# Patient Record
Sex: MALE | Race: WHITE | Employment: STUDENT | URBAN - METROPOLITAN AREA
[De-identification: names, ages, dates, MRNs, and addresses within clinical notes are randomized per-mention and may not be internally consistent; named-entity substitution may affect disease eponyms.]

---
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
## 2022-02-08 RX ORDER — MULTIVIT WITH MINERALS/LUTEIN
1000 TABLET ORAL DAILY
COMMUNITY

## 2022-02-08 RX ORDER — GABAPENTIN 300 MG/1
400 CAPSULE ORAL NIGHTLY
COMMUNITY

## 2022-02-08 RX ORDER — MULTIVIT,TX WITH IRON,MINERALS
1 TABLET, EXTENDED RELEASE ORAL DAILY
COMMUNITY

## 2022-02-08 RX ORDER — MELATONIN
1000 DAILY
COMMUNITY

## 2022-02-08 RX ORDER — CYCLOBENZAPRINE HCL 10 MG
10 TABLET ORAL 3 TIMES DAILY PRN
COMMUNITY
End: 2022-03-14

## 2022-03-11 ENCOUNTER — LAB ENCOUNTER (OUTPATIENT)
Dept: LAB | Facility: HOSPITAL | Age: 22
End: 2022-03-11
Attending: ORTHOPAEDIC SURGERY
Payer: COMMERCIAL

## 2022-03-11 DIAGNOSIS — Z01.818 PRE-OP TESTING: ICD-10-CM

## 2022-03-11 DIAGNOSIS — M51.26 HNP (HERNIATED NUCLEUS PULPOSUS), LUMBAR: ICD-10-CM

## 2022-03-11 LAB
BILIRUB UR QL STRIP.AUTO: NEGATIVE
COLOR UR AUTO: YELLOW
GLUCOSE UR STRIP.AUTO-MCNC: NEGATIVE MG/DL
KETONES UR STRIP.AUTO-MCNC: NEGATIVE MG/DL
LEUKOCYTE ESTERASE UR QL STRIP.AUTO: NEGATIVE
NITRITE UR QL STRIP.AUTO: NEGATIVE
PH UR STRIP.AUTO: 7.5 [PH] (ref 5–8)
PROT UR STRIP.AUTO-MCNC: NEGATIVE MG/DL
RBC UR QL AUTO: NEGATIVE
SP GR UR STRIP.AUTO: 1.02 (ref 1–1.03)
UROBILINOGEN UR STRIP.AUTO-MCNC: 0.2 MG/DL

## 2022-03-11 PROCEDURE — 81003 URINALYSIS AUTO W/O SCOPE: CPT

## 2022-03-12 LAB — SARS-COV-2 RNA RESP QL NAA+PROBE: NOT DETECTED

## 2022-03-14 ENCOUNTER — HOSPITAL ENCOUNTER (OUTPATIENT)
Facility: HOSPITAL | Age: 22
Setting detail: HOSPITAL OUTPATIENT SURGERY
Discharge: HOME | End: 2022-03-14
Attending: ORTHOPAEDIC SURGERY | Admitting: ORTHOPAEDIC SURGERY
Payer: COMMERCIAL

## 2022-03-14 ENCOUNTER — ANESTHESIA (OUTPATIENT)
Dept: SURGERY | Facility: HOSPITAL | Age: 22
End: 2022-03-14
Payer: COMMERCIAL

## 2022-03-14 ENCOUNTER — APPOINTMENT (OUTPATIENT)
Dept: GENERAL RADIOLOGY | Facility: HOSPITAL | Age: 22
End: 2022-03-14
Attending: ORTHOPAEDIC SURGERY
Payer: COMMERCIAL

## 2022-03-14 ENCOUNTER — ANESTHESIA EVENT (OUTPATIENT)
Dept: SURGERY | Facility: HOSPITAL | Age: 22
End: 2022-03-14
Payer: COMMERCIAL

## 2022-03-14 VITALS
HEIGHT: 76 IN | SYSTOLIC BLOOD PRESSURE: 124 MMHG | OXYGEN SATURATION: 96 % | WEIGHT: 286.63 LBS | HEART RATE: 75 BPM | RESPIRATION RATE: 16 BRPM | TEMPERATURE: 98 F | BODY MASS INDEX: 34.9 KG/M2 | DIASTOLIC BLOOD PRESSURE: 83 MMHG

## 2022-03-14 DIAGNOSIS — M51.26 HNP (HERNIATED NUCLEUS PULPOSUS), LUMBAR: ICD-10-CM

## 2022-03-14 DIAGNOSIS — Z01.818 PRE-OP TESTING: Primary | ICD-10-CM

## 2022-03-14 PROCEDURE — 0SB20ZZ EXCISION OF LUMBAR VERTEBRAL DISC, OPEN APPROACH: ICD-10-PCS | Performed by: ORTHOPAEDIC SURGERY

## 2022-03-14 PROCEDURE — 72020 X-RAY EXAM OF SPINE 1 VIEW: CPT | Performed by: ORTHOPAEDIC SURGERY

## 2022-03-14 PROCEDURE — 88304 TISSUE EXAM BY PATHOLOGIST: CPT | Performed by: ORTHOPAEDIC SURGERY

## 2022-03-14 PROCEDURE — 88311 DECALCIFY TISSUE: CPT | Performed by: ORTHOPAEDIC SURGERY

## 2022-03-14 DEVICE — COLLAGEN DURAL REGENERATION MEMBRANE 1IN X 3IN (2.5CM X 7.5CM)
Type: IMPLANTABLE DEVICE | Site: BACK | Status: FUNCTIONAL
Brand: DURAMATRIX-ONLAY PLUS

## 2022-03-14 RX ORDER — VANCOMYCIN 2 GRAM/500 ML IN 0.9 % SODIUM CHLORIDE INTRAVENOUS
15 ONCE
Status: COMPLETED | OUTPATIENT
Start: 2022-03-14 | End: 2022-03-14

## 2022-03-14 RX ORDER — MIDAZOLAM HYDROCHLORIDE 1 MG/ML
INJECTION INTRAMUSCULAR; INTRAVENOUS AS NEEDED
Status: DISCONTINUED | OUTPATIENT
Start: 2022-03-14 | End: 2022-03-14 | Stop reason: SURG

## 2022-03-14 RX ORDER — ROCURONIUM BROMIDE 10 MG/ML
INJECTION, SOLUTION INTRAVENOUS AS NEEDED
Status: DISCONTINUED | OUTPATIENT
Start: 2022-03-14 | End: 2022-03-14 | Stop reason: SURG

## 2022-03-14 RX ORDER — ONDANSETRON 2 MG/ML
4 INJECTION INTRAMUSCULAR; INTRAVENOUS ONCE
Status: COMPLETED | OUTPATIENT
Start: 2022-03-14 | End: 2022-03-14

## 2022-03-14 RX ORDER — HYDROMORPHONE HYDROCHLORIDE 1 MG/ML
0.4 INJECTION, SOLUTION INTRAMUSCULAR; INTRAVENOUS; SUBCUTANEOUS EVERY 5 MIN PRN
Status: DISCONTINUED | OUTPATIENT
Start: 2022-03-14 | End: 2022-03-14

## 2022-03-14 RX ORDER — CELECOXIB 200 MG/1
200 CAPSULE ORAL ONCE
Status: COMPLETED | OUTPATIENT
Start: 2022-03-14 | End: 2022-03-14

## 2022-03-14 RX ORDER — NALOXONE HYDROCHLORIDE 0.4 MG/ML
80 INJECTION, SOLUTION INTRAMUSCULAR; INTRAVENOUS; SUBCUTANEOUS AS NEEDED
Status: DISCONTINUED | OUTPATIENT
Start: 2022-03-14 | End: 2022-03-14

## 2022-03-14 RX ORDER — ACETAMINOPHEN 500 MG
1000 TABLET ORAL ONCE
Status: DISCONTINUED | OUTPATIENT
Start: 2022-03-14 | End: 2022-03-14 | Stop reason: HOSPADM

## 2022-03-14 RX ORDER — SODIUM CHLORIDE, SODIUM LACTATE, POTASSIUM CHLORIDE, CALCIUM CHLORIDE 600; 310; 30; 20 MG/100ML; MG/100ML; MG/100ML; MG/100ML
INJECTION, SOLUTION INTRAVENOUS CONTINUOUS
Status: DISCONTINUED | OUTPATIENT
Start: 2022-03-14 | End: 2022-03-14

## 2022-03-14 RX ORDER — ONDANSETRON 2 MG/ML
INJECTION INTRAMUSCULAR; INTRAVENOUS AS NEEDED
Status: DISCONTINUED | OUTPATIENT
Start: 2022-03-14 | End: 2022-03-14 | Stop reason: SURG

## 2022-03-14 RX ORDER — LIDOCAINE HYDROCHLORIDE 10 MG/ML
INJECTION, SOLUTION EPIDURAL; INFILTRATION; INTRACAUDAL; PERINEURAL AS NEEDED
Status: DISCONTINUED | OUTPATIENT
Start: 2022-03-14 | End: 2022-03-14 | Stop reason: SURG

## 2022-03-14 RX ORDER — HYDROCODONE BITARTRATE AND ACETAMINOPHEN 10; 325 MG/1; MG/1
1 TABLET ORAL EVERY 6 HOURS PRN
Qty: 40 TABLET | Refills: 0 | Status: SHIPPED | OUTPATIENT
Start: 2022-03-14

## 2022-03-14 RX ADMIN — LIDOCAINE HYDROCHLORIDE 50 MG: 10 INJECTION, SOLUTION EPIDURAL; INFILTRATION; INTRACAUDAL; PERINEURAL at 11:00:00

## 2022-03-14 RX ADMIN — ROCURONIUM BROMIDE 50 MG: 10 INJECTION, SOLUTION INTRAVENOUS at 11:01:00

## 2022-03-14 RX ADMIN — VANCOMYCIN 2 GRAM/500 ML IN 0.9 % SODIUM CHLORIDE INTRAVENOUS 2 G: at 11:10:00

## 2022-03-14 RX ADMIN — MIDAZOLAM HYDROCHLORIDE 2 MG: 1 INJECTION INTRAMUSCULAR; INTRAVENOUS at 10:55:00

## 2022-03-14 RX ADMIN — ROCURONIUM BROMIDE 20 MG: 10 INJECTION, SOLUTION INTRAVENOUS at 11:25:00

## 2022-03-14 RX ADMIN — ONDANSETRON 4 MG: 2 INJECTION INTRAMUSCULAR; INTRAVENOUS at 12:46:00

## 2022-03-14 NOTE — ANESTHESIA PROCEDURE NOTES
Airway  Date/Time: 3/14/2022 11:02 AM  Urgency: elective      General Information and Staff    Patient location during procedure: OR  Anesthesiologist: Jolly Dhaliwal MD  Performed: anesthesiologist     Indications and Patient Condition  Indications for airway management: airway protection and anesthesia  Spontaneous Ventilation: absent  Preoxygenated: yes  Patient position: sniffing  Mask difficulty assessment: 1 - vent by mask    Final Airway Details  Final airway type: endotracheal airway      Successful airway: ETT    Successful intubation technique: Video laryngoscopy  Facilitating devices/methods: intubating stylet  Endotracheal tube insertion site: oral  Blade: Christi  Blade size: #3  ETT size (mm): 8.0    Cormack-Lehane Classification: grade I - full view of glottis  Placement verified by: chest auscultation and capnometry   Measured from: teeth  ETT to teeth (cm): 23  Number of attempts at approach: 1  Ventilation between attempts: none  Number of other approaches attempted: 0

## 2022-03-14 NOTE — BRIEF OP NOTE
Pre-Operative Diagnosis: HNP (herniated nucleus pulposus), lumbar [M51.26]     Post-Operative Diagnosis: HNP (herniated nucleus pulposus), lumbar [M51.26]      Procedure Performed:   Lumbar 3-Lumbar 4, Lumbar 4-Lumbar 5 microdecompression      Surgeon(s) and Role:     Rosana Rock MD - Primary    Assistant(s):  PA: Singh Schulz     Surgical Findings: above     Specimen: 1: L4-5 disc herniation  2: L3-4 disc herniation     Estimated Blood Loss: Blood Output: 100 mL (3/14/2022 12:44 PM)      Dictation Number:      COLTON Campos  3/14/2022  1:08 PM

## 2022-03-24 NOTE — OPERATIVE REPORT
[facility]    PATIENT'S NAME: Christiano Kenny   ATTENDING PHYSICIAN: Pawan Marsh M.D. OPERATING PHYSICIAN: Pawan Marsh M.D. PATIENT ACCOUNT#:   [de-identified]    LOCATION:  [facility] 2 EDWP 10  MEDICAL RECORD #:   [id number]       YOB: 2000  ADMISSION DATE:       03/14/2022      OPERATION DATE:  03/14/2022    OPERATIVE REPORT      PREOPERATIVE DIAGNOSIS:  L3-4, L4-5 severe herniated nucleus pulposus, stenosis, central and bilateral.  POSTOPERATIVE DIAGNOSIS:  L3-4, L4-5 severe herniated nucleus pulposus, stenosis, central and bilateral.  PROCEDURE PERFORMED:  L3-4, L4-5 bilateral decompression discectomy. INDICATIONS:  The patient had failed reasonable conservative measures which are outlined in the patient's clinic medical record. Physical therapy, medical management, injections, alternative treatment are among those offered unless motor deficits are progressive. Indications for surgery are based on scientific literature and WESLEY guidelines. A thorough meeting with the patient and at least one key caregiver was had. The risks and benefits were discussed in significant detail. The risks include, but are not limited to, bleeding, infection, spinal leaks, nerve injuries, recurrent disc herniation, lumbar instability, and need for further surgery. I have gone over the operation using models, diagrams, and the patient's own imaging studies. Additional written and digital sources were provided. No guarantees were made. ASSISTANT:  Kieth Skiff. DESTINY Venegas. A skilled and experienced assistant was required for the entire surgical procedure. As a lumbar spinal reconstruction, the procedure is done in immediate proximity to critical neural elements and is done in close proximity to the critical vascular and visceral structures. Much of the surgery is done in and around the cauda equine and its exiting nerves.   Any assistance, including, but not limited to, retraction, suction, and other means of facilitation of the procedure requires an individual with substantial postgraduate training and substantial spinal surgical experience. ESTIMATED BLOOD LOSS:  100 mL. SPECIMENS:  L4-5 disc herniation, L3-4 disc herniation. DESCRIPTION OF PROCEDURE:  After obtaining informed consent, the patient was taken to the operating room. SCDs and BONIFACIO hose were applied. Preoperative antibiotics were delivered. The patient was placed in the prone position on a [employer], Vladimir frame. All bony prominences were padded. The back was sterilely prepped and draped after an incision had been marked and confirmed using x-ray with a sterilely applied needle. The back was sterilely prepped and draped, and a longitudinal incision was made with a #10 blade at the L3-4 and L4-5 levels. Bovie electrocautery was used for hemostasis. Dissection was taken down to the level of the fascia. Subperiosteal dissection was taken at all levels outlined above. Self-retaining retractors were applied. A curette was used to gain access to the canal and to free ligamentum flavum, which was removed with 4 and 5 mm Kerrison. The nerve roots were decompressed using undercutting technique for contralateral neural elements. The nerve root was identified and mobilized gently from lateral to medial over the disc herniation. Disc fragment was identified. It was teased free with a Carmen ball and removed. Additional loose pieces were removed with peapod and upbiting pituitary rongeurs with a nerve retractor safely in place. At L4-5, a very large disc herniation was identified and removed. This was done bilaterally. We irrigated the disc space, and additional loose pieces were removed. The nerve was now mobile. The nerve roots were mobile without any neurocompression in that area. Some stenosis and undercutting hypertrophy in the facets was removed as well.   Contralateral decompression was undertaken thus creating a bilateral decompression using undercutting technique. With undercutting technique and a Carmen ball in place, we excised ligamentum flavum through the bone at the cranial foramina as needed, facilitating a serial cranial decompression and undercutting microforaminotomy and hemilaminotomy of the L4 and L5 nerve roots. This was done in an undercutting fashion as well and done to examine the central and contralateral neural elements facilitating central bilateral decompression as well. We then moved to the L3-4 level. Self-retaining retractors were applied. A curette was used to gain access to the canal and to free ligamentum flavum, which was removed with 4 and 5 mm Kerrison. The nerve roots were decompressed using undercutting technique for contralateral neural elements. The nerve root was identified and mobilized gently from lateral to medial over the disc herniation. Disc fragment was identified. It was teased free with a Carmen ball and removed. Additional loose pieces were removed with peapod and upbiting pituitary rongeurs with a nerve retractor safely in place. A large disc herniation was identified centrally and was removed using bilateral techniques. We irrigated the disc space, and additional loose pieces were removed. The nerve was now mobile    The nerve roots were mobile without any neurocompression in that area. Some stenosis and undercutting hypertrophy in the facets was removed as well. Contralateral decompression was undertaken thus creating a bilateral decompression using undercutting technique. With undercutting technique and a Carmen ball in place, we excised ligamentum flavum through the bone at the cranial foramina as needed, facilitating a serial cranial decompression and undercutting microforaminotomy and hemilaminotomy of the L3-4 and L4-5 nerve roots.   This was done in an undercutting fashion as well and done to examine the central and contralateral neural elements facilitating central bilateral decompression as well. Valsalva maneuver confirmed that there was no spinal leak. The wound was thoroughly irrigated. Hemostasis was maintained. Humphrey Reap was placed over the neural elements. The fascia was reapproximated with figure-of-eight 1-0 Vicryl; 2-0 Vicryl was used for the subcutaneous tissues, and running 3-0 subcuticular stitches were applied. Steri-Strips were applied. Sterile dressings were applied. Needle and Ray-Tami counts were correct at the conclusion of the case.   The patient was extubated and taken to the recovery room in stable condition and neurologically intact on arrival.    Dictated By Nathanael Qureshi M.D.  d: 03/23/2022 13:04:02  t: 03/23/2022 22:57:20  Deana Church [id number]  STG/

## (undated) DEVICE — MARKER SKIN PREP RESIST STRL

## (undated) DEVICE — 3M™ TEGADERM™ TRANSPARENT FILM DRESSING, 1626W, 4 IN X 4-3/4 IN (10 CM X 12 CM), 50 EACH/CARTON, 4 CARTON/CASE: Brand: 3M™ TEGADERM™

## (undated) DEVICE — SUTURE VICRYL 1 OS-6

## (undated) DEVICE — UNDYED BRAIDED (POLYGLACTIN 910), SYNTHETIC ABSORBABLE SUTURE: Brand: COATED VICRYL

## (undated) DEVICE — 450 ML BOTTLE OF 0.05% CHLORHEXIDINE GLUCONATE IN 99.95% STERILE WATER FOR IRRIGATION, USP AND APPLICATOR.: Brand: IRRISEPT ANTIMICROBIAL WOUND LAVAGE

## (undated) DEVICE — LAMINECTOMY CDS: Brand: MEDLINE INDUSTRIES, INC.

## (undated) DEVICE — STERILE POLYISOPRENE POWDER-FREE SURGICAL GLOVES: Brand: PROTEXIS

## (undated) DEVICE — 3.0MM PRECISION ROUND

## (undated) DEVICE — EXOFIN TISSUE ADHESIVE 1.0ML

## (undated) DEVICE — LIGHT HANDLE

## (undated) DEVICE — WRAP COOLING BACK W/NO PILLOW

## (undated) DEVICE — FLOSEAL HEMOSTATIC MATRIX, 5ML: Brand: FLOSEAL HEMOSTATIC MATRIX

## (undated) DEVICE — Device

## (undated) DEVICE — SUTURE VICRYL 2-0 FSL

## (undated) DEVICE — Device: Brand: INTELLICART™

## (undated) DEVICE — 3M™ MICROFOAM™ TAPE 1528-4: Brand: 3M™ MICROFOAM™

## (undated) DEVICE — SCD SLEEVE KNEE HI BLEND

## (undated) DEVICE — SOL  .9 1000ML BTL

## (undated) NOTE — LETTER
OUTSIDE TESTING RESULT REQUEST     IMPORTANT: FOR YOUR IMMEDIATE ATTENTION  Please FAX all test results listed below to: [phone]     Testing already done on or about:     * * * * If testing is NOT complete, arrange with patient A.S.A.P. * * * *      Patient Name: Artur Herbert  Surgery Date: 3/14/2022  CSN: [id number]  Medical Record: [id number]   : 2000 - A: 22 y      Sex: male  Surgeon(s):  Larry Villalobos MD  Procedure: Lumbar 3-Lumbar 4, Lumbar 4-Lumbar 5 microdecompression    Anesthesia Type: General     Surgeon: Larry Villalobos MD     The following Testing and Time Line are REQUIRED PER ANESTHESIA     EKG READ AND SIGNED WITHIN   90 days  CBC [with Differential & Platelets] within  60 days  CMP (requires 4 hour fast) within  60 days  PT/INR within  30 days  PTT within  30 days  UA with Reflex to Culture within  30 days  MSSA/MRSA Nasal screening within 30 days      Thank You,   Sent by:Jocelyn Bush  5/13/15